# Patient Record
(demographics unavailable — no encounter records)

---

## 2018-04-24 NOTE — EDPHY
General


Time Seen by Provider: 04/24/18 11:21


Narrative: 





CHIEF COMPLAINT: 


The alcohol detox, wants to go to the [facility]





HISTORY OF PRESENT ILLNESS: 


Patient presents with this friend at bedside with reports of alcohol abuse and 

is requesting detox at the [facility].  He says that he 

typically ingest nearly 30 beers per day.  He says he had 4 beers this morning.

  He is wanting to detox with supervision, thus he would like to go to the [facility].

  He has had a history of seizure from alcohol withdrawal remotely.  He has 

presented to the [facility] in the past and was told to come here 

1st, thus he has presented here 1st.  He has no complaints of headache or chest 

pain.  His friend at bedside says he has not witnessed any seizure activity.  

He is requesting Librium taper protocol.  No other associated complaints or 

modifying factors.





REVIEW OF SYSTEMS:


Ten systems reviewed and are negative unless otherwise noted in the HPI





PCP:


None currently





SPECIALISTS:


None





PAST MEDICAL HISTORY: 


Alcohol abuse





PAST SURGICAL HISTORY:


No recent surgeries





SOCIAL HISTORY:


Nonsmoker.  Daily alcohol use of 30 beers per day.  Denies drug use.  Works 

here locally as a 





FAMILY HISTORY:


Noncontributory





EXAMINATION


General Appearance:  Alert, no distress.  Well-developed and well-nourished.


Head: normocephalic, atraumatic


Eyes:  Pupils equal and round, no conjunctival pallor or injection


ENT, Mouth:  Mucous membranes moist.  Airway patent


Neck:  Normal inspection, supple, non-tender


Respiratory:  Lungs are clear to auscultation


Cardiovascular:  Regular rate and rhythm


Gastrointestinal:  Abdomen is soft and nontender.  No distention.  No tympany


Back: non-tender, no bony abnormalities


Neurological:  GCS 15. A&O, nonfocal, normal gait.  No tremor.  Normal 

mentation.


Skin:  Warm and dry, no rash no petechiae or purpura


Extremities:  Nontender, no pedal edema


Psychiatric:  Mood and affect normal





DIFFERENTIAL DIAGNOSES:


Including but not limited to acute alcohol intoxication, alcohol abuse, alcohol 

dependency, call withdrawal








MDM:


11:35 a.m.


Acute alcohol intoxication with abuse and asking for detox.  No evidence of 

delirium tremens or encephalopathy.  He is in no acute distress.  His neuro 

exam is normal with no signs of seizure or tremor.  I do feel he is a good 

candidate for supervised detoxification.  He will be provided a prepack of 

Librium per protocol.  His friend at bedside will take him there directly.  We 

discussed that he cannot combine alcohol with a Librium.  We discussed ED 

precautions, and I do feel he is stable for discharge home at this time.  








SUPERVISION:


This patient was independently evaluated without direct involvement of or 

examination by the attending physician. 








- History


Smoking Status: Never smoked





- Objective


Vital Signs: 


 Initial Vital Signs











Temperature (C)  98.8 F   04/24/18 11:09


 


Heart Rate  88   04/24/18 11:09


 


Respiratory Rate  16   04/24/18 11:09


 


Blood Pressure  152/96 H  04/24/18 11:09


 


O2 Sat (%)  96   04/24/18 11:09








 











O2 Delivery Mode               Room Air














Allergies/Adverse Reactions: 


 





No Known Allergies Allergy (Unverified 04/24/18 11:09)


 








Home Medications: 














 Medication  Instructions  Recorded


 


NK [No Known Home Meds]  04/24/18











Medications Given: 


 








Discontinued Medications





Chlordiazepoxide (Librium 25 Mg Prepack#6)  1 btl TAKEHOME EDNOW ONE


   Stop: 04/24/18 11:43


   Last Admin: 04/24/18 12:06 Dose:  1 btl


Chlordiazepoxide HCl (Librium)  50 mg PO EDNOW ONE


   Stop: 04/24/18 11:43


   Last Admin: 04/24/18 12:06 Dose:  50 mg








Departure





- Departure


Disposition: Home, Routine, Self-Care


Clinical Impression: 


Alcohol intoxication


Qualifiers:


 Complication of substance-induced condition: uncomplicated Qualified Code(s): 

F10.920 - Alcohol use, unspecified with intoxication, uncomplicated





Condition: Good


Instructions:  Chlordiazepoxide (By mouth), Alcohol Intoxication (ED), Abuse of 

Alcohol (ED)


Additional Instructions: 


1. Proceed directly to the [facility] for detox


2. Do not combine Librium and alcohol at any time


3. Contact [facility] to establish as a new patient


Referrals: 


[facility] [Outside] - As per Instructions


[facility],. [Clinic] - As per Instructions

## 2018-04-24 NOTE — EDPHY
H & P


Source: Patient, RN/MD


Exam Limitations: No limitations





- Medical/Surgical History


Hx Asthma: No


Hx Chronic Respiratory Disease: No


Hx Diabetes: No


Hx Cardiac Disease: No


Hx Renal Disease: No


Hx Cirrhosis: No


Hx Alcoholism: Yes


Hx HIV/AIDS: No


Hx Splenectomy or Spleen Trauma: No


Other PMH: Alcoholism, testicular cancer, withdrawl sz





- Social History


Smoking Status: Never smoked


Time Seen by Provider: 04/24/18 22:37


HPI/ROS: 


HPI:  This is a 47-year-old male who presents with





Chief Complaint:  Alcohol withdrawal symptoms





Location: body 


Quality:  Alcohol withdrawal


Duration:  Today


Signs and Symptoms: no fever, + nausea, no vomiting, no hematemesis, no blood 

in stool, no abdominal bloating, no diarrhea, no back pain, no urinary symptoms

, no testicular/groin pain, no indigestion, no chest pain, no shortness of 

breath


Timing:  Acute on chronic 


Severity:  Moderate to severe


Context:  Patient presents from the [facility] with complaints of alcohol withdrawal.  

He reports that his last drink was around 0100. Breathalyzer was negative at 

1700. He reports that he received 2 doses of Librium at the [facility] since discharge 

from the emergency room this morning.  He reports that he has been admitted to 

the ICU in the past for alcohol withdrawal seizures.  He complains of tremors, 

tactile disturbances, nausea, dry heaving, disorientation.  Denies any suicidal 

ideation/homicidal ideation/hallucinations.  States that he normally drinks 30-

36 beers every evening. 


Modifying Factors:  Librium with mild relief





Comment: 








ROS: see HPI


Constitutional: No fever, no chills, no weight loss


Eyes:  No blurred vision


Respiratory:  No shortness of breath, no cough


Cardiovascular:  No chest pain, no palpitations


Gastrointestinal:  No nausea, no vomiting, no diarrhea, no hematemesis, no 

blood in stool 


Genitourinary:  No dysuria, no blood in urine 


Extremities:  No myalgias, no edema


Neurologic:  No weakness, no numbness


Skin:  No rashes, no petechiae


Hematologic:  No bruising, no bleeding





MEDICAL/SURGICAL/SOCIAL HISTORY: 


Medical history: Alcoholism, testicular cancer, withdrawal seizures


Surgical history:  Denies


Social history:  Originally from [facility]. Family history 

noncontributory.








CONSTITUTIONAL:  Calm and pleasant  male, awake and alert, no obvious 

distress


HEENT: Atraumatic and normocephalic, PERRL, EOMI.  Nares patent; no rhinorrhea;

  no nasal mucosal edema. Tympanic membranes clear. Oropharynx clear, no 

exudate and moist pink mucosa.  Airway patent.  No lymphadenopathy.  No 

meningismus.


Cardiovascular: Normal S1/S2, regular rate, regular rhythm, without murmur rub 

or gallop.


PULMONARY/CHEST:  Symmetrical and nontender. Clear to auscultation bilaterally. 

Good air movement. No accessory muscle usage.


ABDOMEN:  Soft, nondistended, nontender, no rebound, no guarding, no peritoneal 

signs, no masses or organomegaly. No CVAT.


EXTREMITIES:  2/2 pulses, strength 5/5, no deformities, no clubbing, no 

cyanosis or edema.


NEUROLOGICAL: no focal neuro deficits.  GCS 15.


SKIN: Warm and dry, no erythema. no rash.  Good capillary refill. 





 (Jania Soriano)


Constitutional: 


 Initial Vital Signs











Temperature (C)  36.7 C   04/24/18 22:40


 


Heart Rate  95   04/24/18 22:40


 


Respiratory Rate  18   04/24/18 22:40


 


Blood Pressure  126/113 H  04/24/18 22:40


 


O2 Sat (%)  98   04/24/18 22:40








 











O2 Delivery Mode               Room Air














Allergies/Adverse Reactions: 


 





No Known Allergies Allergy (Unverified 04/24/18 22:39)


 








Home Medications: 














 Medication  Instructions  Recorded


 


NK [No Known Home Meds]  04/24/18














Medical Decision Making


ED Course/Re-evaluation: 


Upon arrival CIWA was 19. Given Librium 75 mg, IV Ativan 2 mg, 2 L normal 

saline. 


Reassessed patient 1 hr later; CIWA down to 6. Watching TV and calm.


No signs of delirium/seizure activity


Does not meet M1 hold/ Detainer criteria. 





0015:  End of shift.  Signed over to Dr. Sutton.  Recommend 2 L normal saline IV 

hydration and continued monitoring overnight due to high risk of delirium 

tremens.  In the a.m., discharge back to the [facility]. 








This patient was seen under the supervision of my secondary supervising 

physician.  I evaluated care for this patient independently.  Discussed this 

patient with Dr. Sutton who did not see the patient.  


 (Jania Soriano)


Differential Diagnosis: 


Differential diagnosis includes but is not limited to alcohol withdrawal, 

alcohol withdrawal with delirium, alcohol withdrawal seizures. 


 (Jania Soriano)


Other Provider: 





[id number]  care assumed by me from RENAE Soriano pending further management of the patient'

s alcohol withdrawal.





0500  patient is improved.  He is no longer tremulous.  He is resting 

comfortably.  Will discharge to the [facility] with Librium. (Duc Sutton)





- Data Points


Medications Given: 


 





Lorazepam (Ativan Injection)  0 mg IVP Q1H PRN; Protocol


   PRN Reason: Alcohol Withdrawal w/IV access


   Stop: 04/25/18 11:02


   Last Admin: 04/25/18 01:00 Dose:  2 mg





Discontinued Medications





Chlordiazepoxide HCl (Librium)  75 mg PO EDNOW ONE


   Stop: 04/24/18 22:36


   Last Admin: 04/24/18 22:43 Dose:  75 mg


Sodium Chloride (Ns)  1,000 mls @ 0 mls/hr IV EDNOW ONE; Wide Open


   PRN Reason: Protocol


   Stop: 04/24/18 23:02


   Last Admin: 04/24/18 23:07 Dose:  1,000 mls


Sodium Chloride (Ns)  1,000 mls @ 0 mls/hr IV EDNOW ONE; Wide Open


   PRN Reason: Protocol


   Stop: 04/24/18 23:02


   Last Admin: 04/24/18 23:07 Dose:  1,000 mls


Lorazepam (Ativan Injection)  2 mg IVP EDNOW ONE


   Stop: 04/24/18 22:36


   Last Admin: 04/24/18 22:43 Dose:  2 mg








Departure





- Departure


Clinical Impression: 


 Alcohol abuse





Alcohol withdrawal


Qualifiers:


 Complication of substance-induced condition: uncomplicated Qualified Code(s): 

F10.230 - Alcohol dependence with withdrawal, uncomplicated





Condition: Good


Instructions:  Abuse of Alcohol (ED), Alcohol Withdrawal (ED)


Referrals: 


[facility] [Outside] - As per Instructions


[facility],. [Clinic] - As per Instructions